# Patient Record
Sex: FEMALE | Race: WHITE
[De-identification: names, ages, dates, MRNs, and addresses within clinical notes are randomized per-mention and may not be internally consistent; named-entity substitution may affect disease eponyms.]

---

## 2018-02-05 ENCOUNTER — HOSPITAL ENCOUNTER (OUTPATIENT)
Dept: HOSPITAL 53 - M SFHCADAM | Age: 48
End: 2018-02-05
Attending: PHYSICIAN ASSISTANT
Payer: COMMERCIAL

## 2018-02-05 DIAGNOSIS — E03.9: Primary | ICD-10-CM

## 2018-02-05 LAB — THYROID STIMULATING HORMONE: 0.69 UIU/ML (ref 0.36–3.74)

## 2018-02-05 PROCEDURE — 84443 ASSAY THYROID STIM HORMONE: CPT

## 2018-02-23 ENCOUNTER — HOSPITAL ENCOUNTER (OUTPATIENT)
Dept: HOSPITAL 53 - M CARPUL | Age: 48
End: 2018-02-23
Attending: PHYSICIAN ASSISTANT
Payer: SELF-PAY

## 2018-02-23 DIAGNOSIS — R01.1: Primary | ICD-10-CM

## 2018-02-23 DIAGNOSIS — I36.1: ICD-10-CM

## 2018-02-23 DIAGNOSIS — I27.20: ICD-10-CM

## 2018-02-23 DIAGNOSIS — I34.0: ICD-10-CM

## 2018-08-21 ENCOUNTER — HOSPITAL ENCOUNTER (OUTPATIENT)
Dept: HOSPITAL 53 - M SFHCPLAZ | Age: 48
End: 2018-08-21
Attending: PHYSICIAN ASSISTANT
Payer: SELF-PAY

## 2018-08-21 DIAGNOSIS — J02.9: ICD-10-CM

## 2018-08-21 DIAGNOSIS — R10.13: Primary | ICD-10-CM

## 2018-08-21 LAB
ALBUMIN/GLOBULIN RATIO: 0.95 (ref 1–1.93)
ALBUMIN: 3.7 GM/DL (ref 3.2–5.2)
ALKALINE PHOSPHATASE: 83 U/L (ref 45–117)
ALT SERPL W P-5'-P-CCNC: 20 U/L (ref 12–78)
AMYLASE SERPL-CCNC: 64 U/L (ref 25–115)
ANION GAP: 10 MEQ/L (ref 8–16)
AST SERPL-CCNC: 29 U/L (ref 7–37)
BILIRUBIN,TOTAL: 0.3 MG/DL (ref 0.2–1)
BLOOD UREA NITROGEN: 8 MG/DL (ref 7–18)
CALCIUM LEVEL: 8.9 MG/DL (ref 8.5–10.1)
CARBON DIOXIDE LEVEL: 25 MEQ/L (ref 21–32)
CHLORIDE LEVEL: 106 MEQ/L (ref 98–107)
CREATININE FOR GFR: 0.86 MG/DL (ref 0.55–1.3)
GFR SERPL CREATININE-BSD FRML MDRD: > 60 ML/MIN/{1.73_M2} (ref 58–?)
GLUCOSE, FASTING: 103 MG/DL (ref 70–100)
LIPASE: 159 U/L (ref 73–393)
POTASSIUM SERUM: 4.2 MEQ/L (ref 3.5–5.1)
SODIUM LEVEL: 141 MEQ/L (ref 136–145)
TOTAL PROTEIN: 7.6 GM/DL (ref 6.4–8.2)

## 2018-08-21 PROCEDURE — 82150 ASSAY OF AMYLASE: CPT

## 2018-08-22 ENCOUNTER — HOSPITAL ENCOUNTER (OUTPATIENT)
Dept: HOSPITAL 53 - M LABWUC | Age: 48
End: 2018-08-22
Attending: PHYSICIAN ASSISTANT
Payer: SELF-PAY

## 2018-08-22 DIAGNOSIS — R10.13: Primary | ICD-10-CM

## 2018-08-22 LAB
BASO #: 0.1 10^3/UL (ref 0–0.2)
BASO %: 0.6 % (ref 0–1)
EOS #: 0.4 10^3/UL (ref 0–0.5)
EOSINOPHIL NFR BLD AUTO: 4.6 % (ref 0–3)
HEMATOCRIT: 36.7 % (ref 36–47)
HEMOGLOBIN: 11.8 G/DL (ref 12–15.5)
IMMATURE GRANULOCYTE %: 0.4 % (ref 0–3)
LYMPH #: 1.7 10^3/UL (ref 1.5–4.5)
LYMPH %: 21 % (ref 24–44)
MEAN CORPUSCULAR HEMOGLOBIN: 26.6 PG (ref 27–33)
MEAN CORPUSCULAR HGB CONC: 32.2 G/DL (ref 32–36.5)
MEAN CORPUSCULAR VOLUME: 82.8 FL (ref 80–96)
MONO #: 0.8 10^3/UL (ref 0–0.8)
MONO %: 9.6 % (ref 0–5)
NEUTROPHILS #: 5.1 10^3/UL (ref 1.8–7.7)
NEUTROPHILS %: 63.8 % (ref 36–66)
NRBC BLD AUTO-RTO: 0 % (ref 0–0)
PLATELET COUNT, AUTOMATED: 388 10^3/UL (ref 150–450)
RED BLOOD COUNT: 4.43 10^6/UL (ref 4–5.4)
RED CELL DISTRIBUTION WIDTH: 15 % (ref 11.5–14.5)
WHITE BLOOD COUNT: 7.9 10^3/UL (ref 4–10)

## 2019-02-19 ENCOUNTER — HOSPITAL ENCOUNTER (OUTPATIENT)
Dept: HOSPITAL 53 - M SFHCADAM | Age: 49
End: 2019-02-19
Attending: PHYSICIAN ASSISTANT
Payer: COMMERCIAL

## 2019-02-19 DIAGNOSIS — Z00.00: Primary | ICD-10-CM

## 2019-02-19 DIAGNOSIS — E55.9: ICD-10-CM

## 2019-02-19 DIAGNOSIS — E78.5: ICD-10-CM

## 2019-02-19 DIAGNOSIS — E03.9: ICD-10-CM

## 2019-02-19 LAB
ALBUMIN SERPL BCG-MCNC: 3.6 GM/DL (ref 3.2–5.2)
ALT SERPL W P-5'-P-CCNC: 17 U/L (ref 12–78)
BASOPHILS # BLD AUTO: 0.1 10^3/UL (ref 0–0.2)
BASOPHILS NFR BLD AUTO: 0.8 % (ref 0–1)
BILIRUB SERPL-MCNC: 0.3 MG/DL (ref 0.2–1)
BUN SERPL-MCNC: 10 MG/DL (ref 7–18)
CALCIUM SERPL-MCNC: 8.9 MG/DL (ref 8.5–10.1)
CHLORIDE SERPL-SCNC: 108 MEQ/L (ref 98–107)
CHOLEST SERPL-MCNC: 218 MG/DL (ref ?–200)
CHOLEST/HDLC SERPL: 3.63 {RATIO} (ref ?–5)
CO2 SERPL-SCNC: 22 MEQ/L (ref 21–32)
CREAT SERPL-MCNC: 0.83 MG/DL (ref 0.55–1.3)
EOSINOPHIL # BLD AUTO: 0.4 10^3/UL (ref 0–0.5)
EOSINOPHIL NFR BLD AUTO: 4.9 % (ref 0–3)
GFR SERPL CREATININE-BSD FRML MDRD: > 60 ML/MIN/{1.73_M2} (ref 58–?)
GLUCOSE SERPL-MCNC: 91 MG/DL (ref 70–100)
HCT VFR BLD AUTO: 38 % (ref 36–47)
HDLC SERPL-MCNC: 60 MG/DL (ref 40–?)
HGB BLD-MCNC: 12.3 G/DL (ref 12–15.5)
LDLC SERPL CALC-MCNC: 142 MG/DL (ref ?–100)
LYMPHOCYTES # BLD AUTO: 1.6 10^3/UL (ref 1.5–4.5)
LYMPHOCYTES NFR BLD AUTO: 22.5 % (ref 24–44)
MCH RBC QN AUTO: 26.3 PG (ref 27–33)
MCHC RBC AUTO-ENTMCNC: 32.4 G/DL (ref 32–36.5)
MCV RBC AUTO: 81.2 FL (ref 80–96)
MONOCYTES # BLD AUTO: 0.6 10^3/UL (ref 0–0.8)
MONOCYTES NFR BLD AUTO: 8.7 % (ref 0–5)
NEUTROPHILS # BLD AUTO: 4.5 10^3/UL (ref 1.8–7.7)
NEUTROPHILS NFR BLD AUTO: 62.8 % (ref 36–66)
NONHDLC SERPL-MCNC: 158 MG/DL
PLATELET # BLD AUTO: 343 10^3/UL (ref 150–450)
POTASSIUM SERPL-SCNC: 4.4 MEQ/L (ref 3.5–5.1)
PROT SERPL-MCNC: 6.9 GM/DL (ref 6.4–8.2)
RBC # BLD AUTO: 4.68 10^6/UL (ref 4–5.4)
SODIUM SERPL-SCNC: 140 MEQ/L (ref 136–145)
T4 FREE SERPL-MCNC: 1.21 NG/DL (ref 0.76–1.46)
TRIGL SERPL-MCNC: 79 MG/DL (ref ?–150)
TSH SERPL DL<=0.005 MIU/L-ACNC: 0.31 UIU/ML (ref 0.36–3.74)
WBC # BLD AUTO: 7.2 10^3/UL (ref 4–10)

## 2019-04-01 ENCOUNTER — HOSPITAL ENCOUNTER (OUTPATIENT)
Dept: HOSPITAL 53 - M WUC | Age: 49
End: 2019-04-01
Attending: NURSE PRACTITIONER

## 2019-04-01 DIAGNOSIS — Z12.31: Primary | ICD-10-CM

## 2019-04-01 NOTE — REPMRS
Patient History

The patient states she had a clinical breast exam in 04/2019.

Family history of breast cancer at age 50 or over in paternal 

aunt.

Benign stereotactic core biopsy of the left breast, May 12, 2010.

No Hormone Replacement Therapy

 

3D TOMOSYNTHESIS WAS PERFORMED.

 

Digital Woman Screen Mammo: April 1, 2019 - Exam #: 

[id number]

Bilateral CC and MLO view(s) were taken.

 

Technologist: Leda Gentile, Technologist

Prior study comparison: January 25, 2018, digital woman screen 

mammo performed at [facility].  September 9, 2016, right breast digital mammo diagnostic unilateral, 

performed at [facility].

 

FINDINGS: The breast tissue is heterogeneously dense.  This may 

lower the sensitivity of mammography.  There is a fairly 

symmetric fibroglandular pattern in both breasts.  There has been

no interval development of masses, areas of architectural 

distortion or clusters of microcalcifications typical of 

malignancy.

 

Assessment: BI-RADS/ACR category 2 mammogram. Benign Findings.

 

Recommendation

Routine screening mammogram of both breasts in 1 year (for women 

over age 40).

This mammogram was interpreted with the aid of an FDA-approved 

computer-aided dectection system.

 

Electronically Signed By: Jarad Cook MD 04/01/19 1595

## 2019-04-30 ENCOUNTER — HOSPITAL ENCOUNTER (OUTPATIENT)
Dept: HOSPITAL 53 - M SFHCADAM | Age: 49
End: 2019-04-30
Attending: PHYSICIAN ASSISTANT
Payer: COMMERCIAL

## 2019-04-30 DIAGNOSIS — E03.9: Primary | ICD-10-CM

## 2020-02-18 ENCOUNTER — HOSPITAL ENCOUNTER (OUTPATIENT)
Dept: HOSPITAL 53 - M SFHCADAM | Age: 50
End: 2020-02-18
Attending: PHYSICIAN ASSISTANT
Payer: COMMERCIAL

## 2020-02-18 DIAGNOSIS — E55.9: ICD-10-CM

## 2020-02-18 DIAGNOSIS — E03.9: ICD-10-CM

## 2020-02-18 DIAGNOSIS — Z00.00: Primary | ICD-10-CM

## 2020-02-18 DIAGNOSIS — E78.5: ICD-10-CM

## 2020-02-18 LAB
25(OH)D3 SERPL-MCNC: 27.9 NG/ML (ref 30–100)
ALBUMIN SERPL BCG-MCNC: 3.8 GM/DL (ref 3.2–5.2)
ALT SERPL W P-5'-P-CCNC: 16 U/L (ref 12–78)
BILIRUB SERPL-MCNC: 0.4 MG/DL (ref 0.2–1)
BUN SERPL-MCNC: 12 MG/DL (ref 7–18)
CALCIUM SERPL-MCNC: 8.9 MG/DL (ref 8.5–10.1)
CHLORIDE SERPL-SCNC: 108 MEQ/L (ref 98–107)
CHOLEST SERPL-MCNC: 227 MG/DL (ref ?–200)
CHOLEST/HDLC SERPL: 5.28 {RATIO} (ref ?–5)
CO2 SERPL-SCNC: 26 MEQ/L (ref 21–32)
CREAT SERPL-MCNC: 0.9 MG/DL (ref 0.55–1.3)
GFR SERPL CREATININE-BSD FRML MDRD: > 60 ML/MIN/{1.73_M2} (ref 51–?)
GLUCOSE SERPL-MCNC: 86 MG/DL (ref 70–100)
HCT VFR BLD AUTO: 39.7 % (ref 36–47)
HDLC SERPL-MCNC: 43 MG/DL (ref 40–?)
HGB BLD-MCNC: 13 G/DL (ref 12–15.5)
LDLC SERPL CALC-MCNC: 160 MG/DL (ref ?–100)
MCH RBC QN AUTO: 27.6 PG (ref 27–33)
MCHC RBC AUTO-ENTMCNC: 32.7 G/DL (ref 32–36.5)
MCV RBC AUTO: 84.3 FL (ref 80–96)
NONHDLC SERPL-MCNC: 184 MG/DL
PLATELET # BLD AUTO: 390 10^3/UL (ref 150–450)
POTASSIUM SERPL-SCNC: 3.8 MEQ/L (ref 3.5–5.1)
PROT SERPL-MCNC: 7 GM/DL (ref 6.4–8.2)
RBC # BLD AUTO: 4.71 10^6/UL (ref 4–5.4)
SODIUM SERPL-SCNC: 140 MEQ/L (ref 136–145)
TRIGL SERPL-MCNC: 118 MG/DL (ref ?–150)
TSH SERPL DL<=0.005 MIU/L-ACNC: 1.29 UIU/ML (ref 0.36–3.74)
WBC # BLD AUTO: 6.6 10^3/UL (ref 4–10)

## 2020-05-21 ENCOUNTER — HOSPITAL ENCOUNTER (OUTPATIENT)
Dept: HOSPITAL 53 - M SFHCWAGY | Age: 50
End: 2020-05-21
Attending: NURSE PRACTITIONER
Payer: COMMERCIAL

## 2020-05-21 ENCOUNTER — HOSPITAL ENCOUNTER (OUTPATIENT)
Dept: HOSPITAL 53 - M WHC | Age: 50
End: 2020-05-21
Attending: NURSE PRACTITIONER
Payer: COMMERCIAL

## 2020-05-21 DIAGNOSIS — Z12.31: Primary | ICD-10-CM

## 2020-05-21 DIAGNOSIS — Z12.4: Primary | ICD-10-CM

## 2020-05-21 DIAGNOSIS — Z01.419: ICD-10-CM

## 2020-05-21 DIAGNOSIS — Z86.018: ICD-10-CM

## 2020-05-21 NOTE — REPMRS
Patient History

The patient states she had a clinical breast exam in May 2020.

 

Family history of breast cancer at age 50 or over in paternal 

aunt.

Benign stereotactic core biopsy of the left breast, May 12, 2010.

No Hormone Replacement Therapy

 

Digital Woman Screen Mammo: May 21, 2020 - Exam #: 

[id number]

Bilateral CC and MLO view(s) were taken.

 

Technologist: Hilary Estrada, Technologist

Prior study comparison: April 1, 2019, bilateral digital woman 

screen mammo performed at [facility].  January 25, 2018, digital woman screen mammo 

performed at [facility].  

September 6, 2016, digital woman screen mammo performed at 

[facility].

 

FINDINGS: There are scattered fibroglandular densities.  The 

Volpara volumetric breast density category is: B. There are two 

needle biopsy marker clips again noted in the left breast 

unchanged adjacent to a stable nodular opacities.  The previously

noted right breast nodular opacity has regressed somewhat in 

size.  The previously noted nodular opacity in the medial left 

breast has regressed. There is a moderate amount of residual 

fibroglandular tissue which is fairly symmetric. There is no 

interval development of dominant mass, architectural distortion, 

or grouped microcalcification typical of malignancy. There has 

been no change in the appearance of the mammogram from the prior 

studies.

3-D tomosynthesis shows no additional findings.

 

Assessment: BI-RADS/ACR category 2 mammogram. Benign Findings.

 

Recommendation

Routine screening mammogram of both breasts in 1 year (for women 

over age 40).

 

This patient's Lifetime Breast Cancer RIsk is estimated at 11.8 

%.

This mammogram was interpreted with the aid of an FDA-approved 

computer-aided dectection system.

 

Electronically Signed By: Dev Narayan MD 05/21/20 5980

## 2020-07-22 ENCOUNTER — HOSPITAL ENCOUNTER (EMERGENCY)
Dept: HOSPITAL 53 - M ED | Age: 50
Discharge: HOME | End: 2020-07-22
Payer: COMMERCIAL

## 2020-07-22 VITALS — HEIGHT: 64 IN | BODY MASS INDEX: 28.04 KG/M2 | WEIGHT: 164.24 LBS

## 2020-07-22 VITALS — SYSTOLIC BLOOD PRESSURE: 133 MMHG | DIASTOLIC BLOOD PRESSURE: 76 MMHG

## 2020-07-22 DIAGNOSIS — S01.81XA: Primary | ICD-10-CM
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
## 2020-07-29 ENCOUNTER — HOSPITAL ENCOUNTER (EMERGENCY)
Dept: HOSPITAL 53 - M ED | Age: 50
Discharge: HOME | End: 2020-07-29
Payer: COMMERCIAL

## 2020-07-29 DIAGNOSIS — Z48.02: Primary | ICD-10-CM

## 2021-02-08 ENCOUNTER — HOSPITAL ENCOUNTER (OUTPATIENT)
Dept: HOSPITAL 53 - M SFHCADAM | Age: 51
End: 2021-02-08
Attending: PHYSICIAN ASSISTANT
Payer: COMMERCIAL

## 2021-02-08 DIAGNOSIS — E03.9: ICD-10-CM

## 2021-02-08 DIAGNOSIS — E78.5: Primary | ICD-10-CM

## 2021-02-08 LAB
ALBUMIN SERPL BCG-MCNC: 3.7 GM/DL (ref 3.2–5.2)
ALT SERPL W P-5'-P-CCNC: 18 U/L (ref 12–78)
BILIRUB SERPL-MCNC: 0.3 MG/DL (ref 0.2–1)
BUN SERPL-MCNC: 12 MG/DL (ref 7–18)
CALCIUM SERPL-MCNC: 9.4 MG/DL (ref 8.5–10.1)
CHLORIDE SERPL-SCNC: 107 MEQ/L (ref 98–107)
CHOLEST SERPL-MCNC: 258 MG/DL (ref ?–200)
CHOLEST/HDLC SERPL: 4.87 {RATIO} (ref ?–5)
CO2 SERPL-SCNC: 25 MEQ/L (ref 21–32)
CREAT SERPL-MCNC: 0.89 MG/DL (ref 0.55–1.3)
GFR SERPL CREATININE-BSD FRML MDRD: > 60 ML/MIN/{1.73_M2} (ref 51–?)
GLUCOSE SERPL-MCNC: 85 MG/DL (ref 70–100)
HDLC SERPL-MCNC: 53 MG/DL (ref 40–?)
LDLC SERPL CALC-MCNC: 183 MG/DL (ref ?–100)
NONHDLC SERPL-MCNC: 205 MG/DL
POTASSIUM SERPL-SCNC: 4.2 MEQ/L (ref 3.5–5.1)
PROT SERPL-MCNC: 6.9 GM/DL (ref 6.4–8.2)
SODIUM SERPL-SCNC: 141 MEQ/L (ref 136–145)
TRIGL SERPL-MCNC: 112 MG/DL (ref ?–150)
TSH SERPL DL<=0.005 MIU/L-ACNC: 2.82 UIU/ML (ref 0.36–3.74)

## 2021-05-25 ENCOUNTER — HOSPITAL ENCOUNTER (OUTPATIENT)
Dept: HOSPITAL 53 - M SFHCWAGY | Age: 51
End: 2021-05-25
Attending: NURSE PRACTITIONER
Payer: COMMERCIAL

## 2021-05-25 ENCOUNTER — HOSPITAL ENCOUNTER (OUTPATIENT)
Dept: HOSPITAL 53 - M WHC | Age: 51
End: 2021-05-25
Attending: NURSE PRACTITIONER
Payer: COMMERCIAL

## 2021-05-25 DIAGNOSIS — Z12.4: Primary | ICD-10-CM

## 2021-05-25 DIAGNOSIS — Z12.31: Primary | ICD-10-CM

## 2021-05-25 PROCEDURE — 87624 HPV HI-RISK TYP POOLED RSLT: CPT

## 2021-05-25 NOTE — REPMRS
Patient History

The patient states she had a clinical breast exam in May 2021.

 

Family history of breast cancer at age 50 or over in paternal 

aunt.

Benign stereotactic core biopsy of the left breast, May 12, 2010.

No Hormone Replacement Therapy

No breast complaints today

Patient signed the MRS sheet

1st covid vaccine 3/25/21-left arm-Moderna

2nd covid vaccine 4/23/21-left arm

Priors on PACS

Patient Identification Verified

Patient denied pregnancy

 

Digital Woman Screen Mammo: May 25, 2021 - Exam #: 

[id number]

Bilateral CC and MLO view(s) were taken.

 

Technologist: Hilary Estrada, Technologist

Prior study comparison: May 21, 2020, bilateral digital woman 

screen mammo performed at [facility] 

[facility].  April 1, 2019, bilateral digital woman screen mammo 

performed at [facility].

 

FINDINGS: There are scattered fibroglandular densities.  The 

Volpara volumetric breast density category is: B. Previously 

noted right breast cyst is again seen and is smaller than on the 

2018 study.  There are several calcifications within the edge of 

the previously biopsied nodule in the left breast immediately 

adjacent to a needle biopsy marker clip.  There are 2 marker 

clips in the left breast.  There is a nodular density in the 

medial aspect the left breast which is decreased in size.  In the

central retroareolar region of the left breast at approximately 

12 o'clock position, there is a well-circumscribed 10 mm nodule 

which appears more prominent than on prior views, particularly on

the MLO projection.  This merits further evaluation. There is a 

moderate amount of residual fibroglandular tissue which is fairly

symmetric. There is no other       a interval development of 

dominant mass, architectural distortion, or grouped 

microcalcification typical of malignancy. There has been no 

change in the appearance of the mammogram from the prior studies.

3-D tomosynthesis shows no additional findings.

 

Assessment: BI-RADS/ACR category 0 mammogram, Incomplete: Need 

additional imaging evaluation and/or prior mammograms for 

comparison.

 

Recommendation

Ultrasound and special view mammogram of the left breast (for 

women over age 40).

This patient's  [facility] Lifetime Breast Cancer Risk is 

estimated at 11.6 %.

This mammogram was interpreted with the aid of an FDA-approved 

computer-aided dectection system.

 

Electronically Signed By: Dev Narayan MD 05/25/21 0999

## 2021-06-22 ENCOUNTER — HOSPITAL ENCOUNTER (OUTPATIENT)
Dept: HOSPITAL 53 - M WHC | Age: 51
End: 2021-06-22
Attending: NURSE PRACTITIONER
Payer: COMMERCIAL

## 2021-06-22 DIAGNOSIS — R92.2: Primary | ICD-10-CM

## 2021-06-22 PROCEDURE — 76642 ULTRASOUND BREAST LIMITED: CPT

## 2021-06-22 PROCEDURE — 77065 DX MAMMO INCL CAD UNI: CPT

## 2021-06-22 NOTE — REP
INDICATION:

ADDITIONAL VIEWS LT BREAST; LEFT BREAST ADDITIONAL VIEWS.



COMPARISON:

Comparison is made with screening left breast mammography from May 25, 2021 as well as

prior mammography of the left breast May 21, 2020 and April 1, 2019.  Prior left

breast sonography is reviewed from April 19, 2010.



TECHNIQUE:

Magnified focal spot-compression CC and MLO views of the left breast are obtained.  A

true mL views obtained with 3D mammography.  Targeted left breast sonography is

carried in the region of the 12 o'clock position of the left breast.

This mammogram was interpreted with the aid of an FDA-approved computer-aided

detection system.



FINDINGS:

On diagnostic mammography images today, there are 2 needle biopsy marker clips

projecting in the upper outer quadrant as noted previously.  On the CC view the

nodular opacity described on May 25, 2021 is visible although less conspicuous.  This

is not seen with confidence on MLO or true mL projection mammographic images.

Scattered fibroglandular elements are seen.  There are microcalcifications adjacent to

a needle biopsy marker clip in 1 of the nodules which was previously biopsied.



The Volpara volumetric breast density pattern is b.



Targeted ultrasound: Targeted left breast sonography is performed and compared with

prior sonography from April 19, 2010.  Heterogeneous fibroglandular background

echotexture is seen.  There are 2 simple cysts in the 12 o'clock position of the left

breast measuring 0.7 and 0.6 cm in greatest diameter respectively.  These are

proximally 2.6 cm from the nipple.



Also in the 12 o'clock position there is a 1.1 x 1.1 x 0.4 cm oval-shaped hypoechoic

nodule 5.5 cm from the nipple which is felt to correspond with hypoechoic nodule seen

on April 19, 2010 prior study.  Previously it measured 1.3 cm in greatest diameter.

No suspicious sonographic abnormality.



IMPRESSION:

BIRADS/ACR category 2 benign mammographic and sonographic left breast findings.



This patient's Tyrer-Cuzick lifetime breast cancer risk assessment score is 11.6%.





RECOMMENDATION:

Repeat screening mammography recommended 1 year (for women over 40).



The patient letter being requested is M1.





<Electronically signed by Dev Narayan > 06/22/21 9600

## 2021-06-28 ENCOUNTER — HOSPITAL ENCOUNTER (OUTPATIENT)
Dept: HOSPITAL 53 - M SFHCWAGY | Age: 51
End: 2021-06-28
Attending: NURSE PRACTITIONER
Payer: COMMERCIAL

## 2021-06-28 DIAGNOSIS — B97.7: ICD-10-CM

## 2021-06-28 DIAGNOSIS — R87.612: Primary | ICD-10-CM

## 2022-03-08 ENCOUNTER — HOSPITAL ENCOUNTER (OUTPATIENT)
Dept: HOSPITAL 53 - M SFHCADAM | Age: 52
End: 2022-03-08
Attending: PHYSICIAN ASSISTANT
Payer: COMMERCIAL

## 2022-03-08 DIAGNOSIS — R01.1: ICD-10-CM

## 2022-03-08 DIAGNOSIS — E78.5: ICD-10-CM

## 2022-03-08 DIAGNOSIS — E03.9: ICD-10-CM

## 2022-03-08 DIAGNOSIS — Z00.00: Primary | ICD-10-CM

## 2022-03-08 DIAGNOSIS — E55.9: ICD-10-CM

## 2022-03-08 LAB
25(OH)D3 SERPL-MCNC: 17.7 NG/ML (ref 30–100)
ALBUMIN SERPL BCG-MCNC: 3.4 GM/DL (ref 3.2–5.2)
ALT SERPL W P-5'-P-CCNC: 27 U/L (ref 12–78)
BASOPHILS # BLD AUTO: 0.1 10^3/UL (ref 0–0.2)
BASOPHILS NFR BLD AUTO: 1.1 % (ref 0–1)
BILIRUB SERPL-MCNC: 0.3 MG/DL (ref 0.2–1)
BUN SERPL-MCNC: 14 MG/DL (ref 7–18)
CALCIUM SERPL-MCNC: 8.6 MG/DL (ref 8.5–10.1)
CHLORIDE SERPL-SCNC: 110 MEQ/L (ref 98–107)
CHOLEST SERPL-MCNC: 225 MG/DL (ref ?–200)
CHOLEST/HDLC SERPL: 4.59 {RATIO} (ref ?–5)
CO2 SERPL-SCNC: 29 MEQ/L (ref 21–32)
CREAT SERPL-MCNC: 0.85 MG/DL (ref 0.55–1.3)
EOSINOPHIL # BLD AUTO: 0.3 10^3/UL (ref 0–0.5)
EOSINOPHIL NFR BLD AUTO: 5.5 % (ref 0–3)
GFR SERPL CREATININE-BSD FRML MDRD: > 60 ML/MIN/{1.73_M2} (ref 51–?)
GLUCOSE SERPL-MCNC: 83 MG/DL (ref 70–100)
HCT VFR BLD AUTO: 36.8 % (ref 36–47)
HDLC SERPL-MCNC: 49 MG/DL (ref 40–?)
HGB BLD-MCNC: 11.8 G/DL (ref 12–15.5)
LDLC SERPL CALC-MCNC: 152 MG/DL (ref ?–100)
LYMPHOCYTES # BLD AUTO: 1.9 10^3/UL (ref 1.5–5)
LYMPHOCYTES NFR BLD AUTO: 30.5 % (ref 24–44)
MCH RBC QN AUTO: 26.7 PG (ref 27–33)
MCHC RBC AUTO-ENTMCNC: 32.1 G/DL (ref 32–36.5)
MCV RBC AUTO: 83.3 FL (ref 80–96)
MONOCYTES # BLD AUTO: 0.5 10^3/UL (ref 0–0.8)
MONOCYTES NFR BLD AUTO: 8.5 % (ref 2–8)
NEUTROPHILS # BLD AUTO: 3.3 10^3/UL (ref 1.5–8.5)
NEUTROPHILS NFR BLD AUTO: 54.1 % (ref 36–66)
NONHDLC SERPL-MCNC: 176 MG/DL
PLATELET # BLD AUTO: 345 10^3/UL (ref 150–450)
POTASSIUM SERPL-SCNC: 4.7 MEQ/L (ref 3.5–5.1)
PROT SERPL-MCNC: 6.5 GM/DL (ref 6.4–8.2)
RBC # BLD AUTO: 4.42 10^6/UL (ref 4–5.4)
SODIUM SERPL-SCNC: 141 MEQ/L (ref 136–145)
TRIGL SERPL-MCNC: 121 MG/DL (ref ?–150)
TSH SERPL DL<=0.005 MIU/L-ACNC: 0.94 UIU/ML (ref 0.36–3.74)
WBC # BLD AUTO: 6.1 10^3/UL (ref 4–10)

## 2022-06-22 ENCOUNTER — HOSPITAL ENCOUNTER (OUTPATIENT)
Dept: HOSPITAL 53 - M SFHCWAGY | Age: 52
End: 2022-06-22
Attending: ADVANCED PRACTICE MIDWIFE
Payer: COMMERCIAL

## 2022-06-22 ENCOUNTER — HOSPITAL ENCOUNTER (OUTPATIENT)
Dept: HOSPITAL 53 - M WHC | Age: 52
End: 2022-06-22
Attending: ADVANCED PRACTICE MIDWIFE
Payer: COMMERCIAL

## 2022-06-22 DIAGNOSIS — Z12.4: Primary | ICD-10-CM

## 2022-06-22 DIAGNOSIS — Z12.31: Primary | ICD-10-CM

## 2022-06-22 DIAGNOSIS — R87.612: ICD-10-CM

## 2022-06-22 PROCEDURE — 87624 HPV HI-RISK TYP POOLED RSLT: CPT

## 2023-03-08 ENCOUNTER — HOSPITAL ENCOUNTER (OUTPATIENT)
Dept: HOSPITAL 53 - M SFHCADAM | Age: 53
End: 2023-03-08
Attending: PHYSICIAN ASSISTANT
Payer: COMMERCIAL

## 2023-03-08 DIAGNOSIS — E03.9: Primary | ICD-10-CM

## 2023-03-08 DIAGNOSIS — E55.9: ICD-10-CM

## 2023-03-08 DIAGNOSIS — E78.5: ICD-10-CM

## 2023-03-08 LAB
25(OH)D3 SERPL-MCNC: 28.2 NG/ML (ref 20–100)
ALBUMIN SERPL BCG-MCNC: 3.5 G/DL (ref 3.2–5.2)
ALP SERPL-CCNC: 75 U/L (ref 46–116)
ALT SERPL W P-5'-P-CCNC: 17 U/L (ref 7–40)
AST SERPL-CCNC: 19 U/L (ref ?–34)
BASOPHILS # BLD AUTO: 0.1 10^3/UL (ref 0–0.2)
BASOPHILS NFR BLD AUTO: 0.7 % (ref 0–1)
BILIRUB SERPL-MCNC: 0.4 MG/DL (ref 0.3–1.2)
BUN SERPL-MCNC: 12 MG/DL (ref 9–23)
CALCIUM SERPL-MCNC: 8.7 MG/DL (ref 8.5–10.1)
CHLORIDE SERPL-SCNC: 104 MMOL/L (ref 98–107)
CHOLEST SERPL-MCNC: 213 MG/DL (ref ?–200)
CHOLEST/HDLC SERPL: 3.75 {RATIO} (ref ?–5)
CO2 SERPL-SCNC: 27 MMOL/L (ref 20–31)
CREAT SERPL-MCNC: 0.79 MG/DL (ref 0.55–1.3)
EOSINOPHIL # BLD AUTO: 0.3 10^3/UL (ref 0–0.5)
EOSINOPHIL NFR BLD AUTO: 4.1 % (ref 0–3)
GFR SERPL CREATININE-BSD FRML MDRD: > 60 ML/MIN/{1.73_M2} (ref 51–?)
GLUCOSE SERPL-MCNC: 84 MG/DL (ref 60–100)
HCT VFR BLD AUTO: 36.7 % (ref 36–47)
HDLC SERPL-MCNC: 56.7 MG/DL (ref 40–?)
HGB BLD-MCNC: 12 G/DL (ref 12–15.5)
IRON SATN MFR SERPL: 15.9 % (ref 13.2–45)
IRON SERPL-MCNC: 51 UG/DL (ref 50–170)
LDLC SERPL CALC-MCNC: 142.5 MG/DL (ref ?–100)
LYMPHOCYTES # BLD AUTO: 1.7 10^3/UL (ref 1.5–5)
LYMPHOCYTES NFR BLD AUTO: 24.9 % (ref 24–44)
MCH RBC QN AUTO: 27.3 PG (ref 27–33)
MCHC RBC AUTO-ENTMCNC: 32.7 G/DL (ref 32–36.5)
MCV RBC AUTO: 83.4 FL (ref 80–96)
MONOCYTES # BLD AUTO: 0.6 10^3/UL (ref 0–0.8)
MONOCYTES NFR BLD AUTO: 8.5 % (ref 2–8)
NEUTROPHILS # BLD AUTO: 4.2 10^3/UL (ref 1.5–8.5)
NEUTROPHILS NFR BLD AUTO: 61.4 % (ref 36–66)
NONHDLC SERPL-MCNC: 156.3 MG/DL
PLATELET # BLD AUTO: 323 10^3/UL (ref 150–450)
POTASSIUM SERPL-SCNC: 4.5 MMOL/L (ref 3.5–5.1)
PROT SERPL-MCNC: 6.3 G/DL (ref 5.7–8.2)
RBC # BLD AUTO: 4.4 10^6/UL (ref 4–5.4)
SODIUM SERPL-SCNC: 138 MMOL/L (ref 136–145)
TIBC SERPL-MCNC: 321 UG/DL (ref 250–425)
TRIGL SERPL-MCNC: 69 MG/DL (ref ?–150)
TSH SERPL DL<=0.005 MIU/L-ACNC: 0.82 UIU/ML (ref 0.55–4.78)
WBC # BLD AUTO: 6.9 10^3/UL (ref 4–10)

## 2023-09-08 ENCOUNTER — HOSPITAL ENCOUNTER (OUTPATIENT)
Dept: HOSPITAL 53 - M WHC | Age: 53
End: 2023-09-08
Attending: ADVANCED PRACTICE MIDWIFE
Payer: COMMERCIAL

## 2023-09-08 ENCOUNTER — HOSPITAL ENCOUNTER (OUTPATIENT)
Dept: HOSPITAL 53 - M PLALAB | Age: 53
End: 2023-09-08
Attending: ADVANCED PRACTICE MIDWIFE
Payer: COMMERCIAL

## 2023-09-08 DIAGNOSIS — Z87.42: ICD-10-CM

## 2023-09-08 DIAGNOSIS — Z12.4: Primary | ICD-10-CM

## 2023-09-08 DIAGNOSIS — Z12.31: Primary | ICD-10-CM

## 2023-09-08 PROCEDURE — 87624 HPV HI-RISK TYP POOLED RSLT: CPT

## 2024-09-18 ENCOUNTER — HOSPITAL ENCOUNTER (OUTPATIENT)
Dept: HOSPITAL 53 - M WHC | Age: 54
End: 2024-09-18
Attending: ADVANCED PRACTICE MIDWIFE
Payer: COMMERCIAL

## 2024-09-18 DIAGNOSIS — N63.11: ICD-10-CM

## 2024-09-18 DIAGNOSIS — Z12.31: Primary | ICD-10-CM

## 2024-10-17 ENCOUNTER — HOSPITAL ENCOUNTER (OUTPATIENT)
Dept: HOSPITAL 53 - M WHC | Age: 54
End: 2024-10-17
Attending: ADVANCED PRACTICE MIDWIFE
Payer: COMMERCIAL

## 2024-10-17 DIAGNOSIS — R92.321: ICD-10-CM

## 2024-10-17 DIAGNOSIS — R92.2: Primary | ICD-10-CM

## 2024-10-17 PROCEDURE — 77065 DX MAMMO INCL CAD UNI: CPT

## 2025-03-10 ENCOUNTER — HOSPITAL ENCOUNTER (OUTPATIENT)
Dept: HOSPITAL 53 - M SFHCADAM | Age: 55
End: 2025-03-10
Attending: PHYSICIAN ASSISTANT
Payer: COMMERCIAL

## 2025-03-10 DIAGNOSIS — E03.9: Primary | ICD-10-CM

## 2025-03-10 DIAGNOSIS — E55.9: ICD-10-CM

## 2025-03-10 DIAGNOSIS — E78.5: ICD-10-CM

## 2025-03-10 LAB
25(OH)D3 SERPL-MCNC: 38.1 NG/ML (ref 20–100)
ALBUMIN SERPL BCG-MCNC: 3.8 G/DL (ref 3.2–5.2)
ALP SERPL-CCNC: 92 U/L (ref 35–104)
ALT SERPL W P-5'-P-CCNC: 19 U/L (ref 7–40)
AST SERPL-CCNC: 20 U/L (ref ?–34)
BILIRUB SERPL-MCNC: 0.4 MG/DL (ref 0.3–1.2)
BUN SERPL-MCNC: 15 MG/DL (ref 9–23)
CALCIUM SERPL-MCNC: 9.2 MG/DL (ref 8.5–10.1)
CHLORIDE SERPL-SCNC: 108 MMOL/L (ref 98–107)
CHOLEST SERPL-MCNC: 254 MG/DL (ref ?–200)
CHOLEST/HDLC SERPL: 4.66 {RATIO} (ref ?–5)
CO2 SERPL-SCNC: 24 MMOL/L (ref 20–31)
CREAT SERPL-MCNC: 0.88 MG/DL (ref 0.55–1.3)
GFR SERPL CREATININE-BSD FRML MDRD: > 60 ML/MIN/{1.73_M2} (ref 51–?)
GLUCOSE SERPL-MCNC: 87 MG/DL (ref 60–100)
HDLC SERPL-MCNC: 54.5 MG/DL (ref 40–?)
LDLC SERPL CALC-MCNC: 179.5 MG/DL (ref ?–100)
NONHDLC SERPL-MCNC: 199.5 MG/DL
POTASSIUM SERPL-SCNC: 4.5 MMOL/L (ref 3.5–5.1)
PROT SERPL-MCNC: 6.9 G/DL (ref 5.7–8.2)
SODIUM SERPL-SCNC: 143 MMOL/L (ref 136–145)
TRIGL SERPL-MCNC: 100 MG/DL (ref ?–150)
TSH SERPL DL<=0.005 MIU/L-ACNC: 0.94 UIU/ML (ref 0.55–4.78)